# Patient Record
Sex: FEMALE | HISPANIC OR LATINO | Employment: UNEMPLOYED | URBAN - METROPOLITAN AREA
[De-identification: names, ages, dates, MRNs, and addresses within clinical notes are randomized per-mention and may not be internally consistent; named-entity substitution may affect disease eponyms.]

---

## 2023-02-01 ENCOUNTER — OFFICE VISIT (OUTPATIENT)
Dept: URGENT CARE | Age: 23
End: 2023-02-01

## 2023-02-01 VITALS
WEIGHT: 127 LBS | HEIGHT: 63 IN | HEART RATE: 80 BPM | DIASTOLIC BLOOD PRESSURE: 70 MMHG | RESPIRATION RATE: 20 BRPM | BODY MASS INDEX: 22.5 KG/M2 | SYSTOLIC BLOOD PRESSURE: 108 MMHG

## 2023-02-01 DIAGNOSIS — Z02.4 DRIVER'S PERMIT PE (PHYSICAL EXAMINATION): Primary | ICD-10-CM

## 2023-02-01 NOTE — PROGRESS NOTES
[facility]        NAME: Parviz Enriquez is a 23 y o  female  : 2000    MRN: [id number]  DATE: 2023  TIME: 2:30 PM    Assessment and Plan   's permit PE (physical examination) [Z02 4]  1  's permit PE (physical examination)          Patient presents for drivers permit physical  PMH reviewed   No medical questions/concerns  Assessment completed  Cleared  Patient Instructions       Follow up with PCP as needed    Chief Complaint     Chief Complaint   Patient presents with   • Annual Exam     Pt presents for learners permit physical            History of Present Illness       Patient presents for drivers permit physical  PMH reviewed   No medical questions/concerns  Assessment completed  Cleared  Review of Systems   Review of Systems   Constitutional: Negative for chills and fever  HENT: Negative for hearing loss  Eyes: Negative for pain and visual disturbance  Respiratory: Negative for apnea, shortness of breath and wheezing  Cardiovascular: Negative for chest pain and palpitations  Gastrointestinal: Negative for nausea and vomiting  Endocrine: Negative for polydipsia  Musculoskeletal: Negative for arthralgias and myalgias  Neurological: Negative for dizziness, seizures, syncope and headaches  Psychiatric/Behavioral: Negative for behavioral problems and suicidal ideas  All other systems reviewed and are negative  Current Medications     No current outpatient medications on file  Current Allergies     Allergies as of 2023   • (No Known Allergies)            The following portions of the patient's history were reviewed and updated as appropriate: allergies, current medications, past family history, past medical history, past social history, past surgical history and problem list      History reviewed  No pertinent past medical history  History reviewed  No pertinent surgical history  History reviewed   No pertinent family history  Medications have been verified  Objective   /70 (BP Location: Right arm, Patient Position: Sitting, Cuff Size: Standard)   Pulse 80   Resp 20   Ht 5' 3" (1 6 m)   Wt 57 6 kg (127 lb)   LMP 01/18/2023   BMI 22 50 kg/m²   Patient's last menstrual period was 01/18/2023  Physical Exam     Physical Exam  Vitals reviewed  Constitutional:       General: She is not in acute distress  Appearance: Normal appearance  She is not ill-appearing  HENT:      Head: Normocephalic and atraumatic  Eyes:      Extraocular Movements: Extraocular movements intact  Pupils: Pupils are equal, round, and reactive to light  Cardiovascular:      Rate and Rhythm: Normal rate and regular rhythm  Pulses: Normal pulses  Heart sounds: Normal heart sounds  No murmur heard  Pulmonary:      Effort: Pulmonary effort is normal  No respiratory distress  Breath sounds: Normal breath sounds  Musculoskeletal:         General: No swelling, tenderness or deformity  Normal range of motion  Cervical back: Normal range of motion  Neurological:      General: No focal deficit present  Mental Status: She is alert  Mental status is at baseline  Cranial Nerves: No cranial nerve deficit  Psychiatric:         Mood and Affect: Mood normal          Behavior: Behavior normal          Thought Content:  Thought content normal

## 2023-05-30 ENCOUNTER — TELEPHONE (OUTPATIENT)
Dept: DERMATOLOGY | Facility: CLINIC | Age: 23
End: 2023-05-30

## 2023-05-30 NOTE — TELEPHONE ENCOUNTER
Contacted pt about missed apt today, she informed me that she started school and forgot to reschedule apt  Informed her that at this time I cannot accommodate a NP apt and she should cb to check on cancellations